# Patient Record
Sex: MALE | Race: WHITE
[De-identification: names, ages, dates, MRNs, and addresses within clinical notes are randomized per-mention and may not be internally consistent; named-entity substitution may affect disease eponyms.]

---

## 2019-10-07 ENCOUNTER — HOSPITAL ENCOUNTER (EMERGENCY)
Dept: HOSPITAL 92 - SCSER | Age: 59
Discharge: HOME | End: 2019-10-07
Payer: COMMERCIAL
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
## 2019-10-07 DIAGNOSIS — I25.2: ICD-10-CM

## 2019-10-07 DIAGNOSIS — Z79.899: ICD-10-CM

## 2019-10-07 DIAGNOSIS — S01.81XA: Primary | ICD-10-CM

## 2019-10-07 DIAGNOSIS — Z23: ICD-10-CM

## 2019-10-07 PROCEDURE — 12011 RPR F/E/E/N/L/M 2.5 CM/<: CPT

## 2019-10-07 PROCEDURE — 90715 TDAP VACCINE 7 YRS/> IM: CPT

## 2019-10-07 PROCEDURE — 90471 IMMUNIZATION ADMIN: CPT

## 2020-02-20 ENCOUNTER — HOSPITAL ENCOUNTER (OUTPATIENT)
Dept: HOSPITAL 92 - CCL | Age: 60
Setting detail: OBSERVATION
LOS: 1 days | Discharge: HOME | End: 2020-02-21
Attending: INTERNAL MEDICINE | Admitting: INTERNAL MEDICINE
Payer: COMMERCIAL

## 2020-02-20 VITALS — BODY MASS INDEX: 29.7 KG/M2

## 2020-02-20 DIAGNOSIS — Z88.0: ICD-10-CM

## 2020-02-20 DIAGNOSIS — I25.2: ICD-10-CM

## 2020-02-20 DIAGNOSIS — Z79.899: ICD-10-CM

## 2020-02-20 DIAGNOSIS — I25.10: Primary | ICD-10-CM

## 2020-02-20 DIAGNOSIS — I11.0: ICD-10-CM

## 2020-02-20 DIAGNOSIS — K21.9: ICD-10-CM

## 2020-02-20 DIAGNOSIS — I50.22: ICD-10-CM

## 2020-02-20 DIAGNOSIS — E78.00: ICD-10-CM

## 2020-02-20 DIAGNOSIS — I48.0: ICD-10-CM

## 2020-02-20 LAB
ANION GAP SERPL CALC-SCNC: 9 MMOL/L (ref 10–20)
APTT PPP: 29.5 SEC (ref 22.9–36.1)
BASOPHILS # BLD AUTO: 0 THOU/UL (ref 0–0.2)
BASOPHILS NFR BLD AUTO: 0.7 % (ref 0–1)
BUN SERPL-MCNC: 22 MG/DL (ref 8.4–25.7)
CALCIUM SERPL-MCNC: 9.4 MG/DL (ref 7.8–10.44)
CHD RISK SERPL-RTO: 2.2 (ref ?–4.5)
CHLORIDE SERPL-SCNC: 107 MMOL/L (ref 98–107)
CHOLEST SERPL-MCNC: 106 MG/DL
CO2 SERPL-SCNC: 30 MMOL/L (ref 22–29)
CREAT CL PREDICTED SERPL C-G-VRATE: 131 ML/MIN (ref 70–130)
EOSINOPHIL # BLD AUTO: 0.1 THOU/UL (ref 0–0.7)
EOSINOPHIL NFR BLD AUTO: 3 % (ref 0–10)
GLUCOSE SERPL-MCNC: 95 MG/DL (ref 70–105)
HDLC SERPL-MCNC: 48 MG/DL
HGB BLD-MCNC: 13.8 G/DL (ref 14–18)
INR PPP: 1.1
LDLC SERPL CALC-MCNC: 51 MG/DL
LYMPHOCYTES # BLD: 0.9 THOU/UL (ref 1.2–3.4)
LYMPHOCYTES NFR BLD AUTO: 25.1 % (ref 21–51)
MCH RBC QN AUTO: 31.2 PG (ref 27–31)
MCV RBC AUTO: 93 FL (ref 78–98)
MONOCYTES # BLD AUTO: 0.4 THOU/UL (ref 0.11–0.59)
MONOCYTES NFR BLD AUTO: 10.4 % (ref 0–10)
NEUTROPHILS # BLD AUTO: 2.2 THOU/UL (ref 1.4–6.5)
NEUTROPHILS NFR BLD AUTO: 60.8 % (ref 42–75)
PLATELET # BLD AUTO: 167 THOU/UL (ref 130–400)
POTASSIUM SERPL-SCNC: 4 MMOL/L (ref 3.5–5.1)
PROTHROMBIN TIME: 13.8 SEC (ref 12–14.7)
RBC # BLD AUTO: 4.42 MILL/UL (ref 4.7–6.1)
SODIUM SERPL-SCNC: 142 MMOL/L (ref 136–145)
TRIGL SERPL-MCNC: 37 MG/DL (ref ?–150)
WBC # BLD AUTO: 3.5 THOU/UL (ref 4.8–10.8)

## 2020-02-20 PROCEDURE — 92928 PRQ TCAT PLMT NTRAC ST 1 LES: CPT

## 2020-02-20 PROCEDURE — 99152 MOD SED SAME PHYS/QHP 5/>YRS: CPT

## 2020-02-20 PROCEDURE — 93010 ELECTROCARDIOGRAM REPORT: CPT

## 2020-02-20 PROCEDURE — C1769 GUIDE WIRE: HCPCS

## 2020-02-20 PROCEDURE — C9600 PERC DRUG-EL COR STENT SING: HCPCS

## 2020-02-20 PROCEDURE — 76942 ECHO GUIDE FOR BIOPSY: CPT

## 2020-02-20 PROCEDURE — 99153 MOD SED SAME PHYS/QHP EA: CPT

## 2020-02-20 PROCEDURE — 85025 COMPLETE CBC W/AUTO DIFF WBC: CPT

## 2020-02-20 PROCEDURE — C1874 STENT, COATED/COV W/DEL SYS: HCPCS

## 2020-02-20 PROCEDURE — 80048 BASIC METABOLIC PNL TOTAL CA: CPT

## 2020-02-20 PROCEDURE — 85610 PROTHROMBIN TIME: CPT

## 2020-02-20 PROCEDURE — 36415 COLL VENOUS BLD VENIPUNCTURE: CPT

## 2020-02-20 PROCEDURE — 93458 L HRT ARTERY/VENTRICLE ANGIO: CPT

## 2020-02-20 PROCEDURE — 80053 COMPREHEN METABOLIC PANEL: CPT

## 2020-02-20 PROCEDURE — 85347 COAGULATION TIME ACTIVATED: CPT

## 2020-02-20 PROCEDURE — 80061 LIPID PANEL: CPT

## 2020-02-20 PROCEDURE — G0378 HOSPITAL OBSERVATION PER HR: HCPCS

## 2020-02-20 PROCEDURE — 85730 THROMBOPLASTIN TIME PARTIAL: CPT

## 2020-02-20 PROCEDURE — 93005 ELECTROCARDIOGRAM TRACING: CPT

## 2020-02-20 PROCEDURE — C1887 CATHETER, GUIDING: HCPCS

## 2020-02-20 RX ADMIN — TICAGRELOR SCH MG: 90 TABLET ORAL at 21:05

## 2020-02-20 RX ADMIN — SACUBITRIL AND VALSARTAN SCH TAB: 49; 51 TABLET, FILM COATED ORAL at 21:05

## 2020-02-20 NOTE — EKG
Test Reason : PREOP

Blood Pressure : ***/*** mmHG

Vent. Rate : 056 BPM     Atrial Rate : 056 BPM

   P-R Int : 194 ms          QRS Dur : 090 ms

    QT Int : 408 ms       P-R-T Axes : 081 030 060 degrees

   QTc Int : 393 ms

 

Sinus bradycardia

Otherwise normal ECG

 

Confirmed by KRISHNA JULES ([id number]) on 2/20/2020 5:00:32 PM

 

Referred By:  PA           Confirmed By:KRISHNA JULES

## 2020-02-21 VITALS — SYSTOLIC BLOOD PRESSURE: 103 MMHG | DIASTOLIC BLOOD PRESSURE: 55 MMHG | TEMPERATURE: 97.3 F

## 2020-02-21 LAB
ALBUMIN SERPL BCG-MCNC: 3.9 G/DL (ref 3.5–5)
ALP SERPL-CCNC: 47 U/L (ref 40–110)
ALT SERPL W P-5'-P-CCNC: 28 U/L (ref 8–55)
ANION GAP SERPL CALC-SCNC: 8 MMOL/L (ref 10–20)
AST SERPL-CCNC: 24 U/L (ref 5–34)
BASOPHILS # BLD AUTO: 0 THOU/UL (ref 0–0.2)
BASOPHILS NFR BLD AUTO: 0.9 % (ref 0–1)
BILIRUB SERPL-MCNC: 0.9 MG/DL (ref 0.2–1.2)
BUN SERPL-MCNC: 18 MG/DL (ref 8.4–25.7)
CALCIUM SERPL-MCNC: 8.8 MG/DL (ref 7.8–10.44)
CHLORIDE SERPL-SCNC: 107 MMOL/L (ref 98–107)
CO2 SERPL-SCNC: 27 MMOL/L (ref 22–29)
CREAT CL PREDICTED SERPL C-G-VRATE: 139 ML/MIN (ref 70–130)
EOSINOPHIL # BLD AUTO: 0.1 THOU/UL (ref 0–0.7)
EOSINOPHIL NFR BLD AUTO: 2.6 % (ref 0–10)
GLOBULIN SER CALC-MCNC: 2.3 G/DL (ref 2.4–3.5)
GLUCOSE SERPL-MCNC: 91 MG/DL (ref 70–105)
HGB BLD-MCNC: 13.1 G/DL (ref 14–18)
LYMPHOCYTES # BLD: 1.2 THOU/UL (ref 1.2–3.4)
LYMPHOCYTES NFR BLD AUTO: 23.2 % (ref 21–51)
MCH RBC QN AUTO: 30.8 PG (ref 27–31)
MCV RBC AUTO: 93.8 FL (ref 78–98)
MONOCYTES # BLD AUTO: 0.5 THOU/UL (ref 0.11–0.59)
MONOCYTES NFR BLD AUTO: 8.8 % (ref 0–10)
NEUTROPHILS # BLD AUTO: 3.4 THOU/UL (ref 1.4–6.5)
NEUTROPHILS NFR BLD AUTO: 64.5 % (ref 42–75)
PLATELET # BLD AUTO: 145 THOU/UL (ref 130–400)
POTASSIUM SERPL-SCNC: 3.8 MMOL/L (ref 3.5–5.1)
RBC # BLD AUTO: 4.25 MILL/UL (ref 4.7–6.1)
SODIUM SERPL-SCNC: 138 MMOL/L (ref 136–145)
WBC # BLD AUTO: 5.2 THOU/UL (ref 4.8–10.8)

## 2020-02-21 PROCEDURE — 4A023N7 MEASUREMENT OF CARDIAC SAMPLING AND PRESSURE, LEFT HEART, PERCUTANEOUS APPROACH: ICD-10-PCS | Performed by: INTERNAL MEDICINE

## 2020-02-21 PROCEDURE — B2111ZZ FLUOROSCOPY OF MULTIPLE CORONARY ARTERIES USING LOW OSMOLAR CONTRAST: ICD-10-PCS | Performed by: INTERNAL MEDICINE

## 2020-02-21 RX ADMIN — TICAGRELOR SCH: 90 TABLET ORAL at 08:57

## 2020-02-21 RX ADMIN — SACUBITRIL AND VALSARTAN SCH: 49; 51 TABLET, FILM COATED ORAL at 08:57

## 2020-02-24 NOTE — EKG
Test Reason : S/P STENTS (2)

Blood Pressure : ***/*** mmHG

Vent. Rate : 059 BPM     Atrial Rate : 059 BPM

   P-R Int : 190 ms          QRS Dur : 094 ms

    QT Int : 410 ms       P-R-T Axes : 058 057 066 degrees

   QTc Int : 405 ms

 

Sinus bradycardia

Septal infarct , age undetermined cannot be excluded

Abnormal ECG

Confirmed by KRISHNA JULES ([id number]) on 2/24/2020 8:52:29 AM

 

Referred By:  PA           Confirmed By:KRISHNA JULES

## 2020-02-24 NOTE — EKG
Test Reason : 

Blood Pressure : ***/*** mmHG

Vent. Rate : 053 BPM     Atrial Rate : 053 BPM

   P-R Int : 190 ms          QRS Dur : 098 ms

    QT Int : 414 ms       P-R-T Axes : 072 042 060 degrees

   QTc Int : 388 ms

 

Sinus bradycardia

Nonspecific ST abnormality

Abnormal ECG

Confirmed by KRISHNA JULES ([id number]) on 2/24/2020 8:59:12 AM

 

Referred By:  PA           Confirmed By:KRISHNA JULES

## 2020-02-24 NOTE — DIS
DATE OF ADMISSION:  02/20/2020



DATE OF DISCHARGE:  02/21/2020



FINAL DIAGNOSES:  

1. Coronary artery disease.

2. Previous myocardial infarction.



PROCEDURE:  Left heart catheterization, intracoronary stent implantation. 



The patient was brought to cardiac catheterization lab yesterday.  He had an LAD

stent in the proximal third of the LAD.  There were some proximal LAD lesions which

were not obstructive.  There was a 70% lesion more distally and then a 95% lesion

distal to that.  The LAD did wrap around the apex.  The patient had been having

ongoing chest pressure.  Therefore, we decided to go and intervene on these other

two lesions.  Two stents were placed.  A 2.25 x 12 mm drug coated stent was placed

in the distal lesion and a 3.0 x 16 mm drug coated stent was placed in the more

proximal lesion.  The patient tolerated these well.  The patient did start shaking

at the end of the procedure, it was unclear how much of that was him being cold,

whether some of it could be related to iodine allergy.  He was given Benadryl and

hydrocortisone and given warm blankets and it all resolved, I think most of it is

probably being cold, but there is some chance that some of this could be iodine

related.  The patient will be going home on the same medicines that he came in on

including aspirin, ticagrelor, Entresto, carvedilol, Lipitor, and Zetia.  He will

see us in couple of weeks. 







Job ID:  [id number]

## 2022-11-07 ENCOUNTER — HOSPITAL ENCOUNTER (OUTPATIENT)
Dept: HOSPITAL 92 - SLEEPLAB | Age: 62
Discharge: HOME | End: 2022-11-07
Attending: NURSE PRACTITIONER
Payer: COMMERCIAL

## 2022-11-07 DIAGNOSIS — R53.83: ICD-10-CM

## 2022-11-07 DIAGNOSIS — G47.00: ICD-10-CM

## 2022-11-07 DIAGNOSIS — I25.10: ICD-10-CM

## 2022-11-07 DIAGNOSIS — I51.89: ICD-10-CM

## 2022-11-07 DIAGNOSIS — G47.33: Primary | ICD-10-CM

## 2022-11-07 DIAGNOSIS — I50.9: ICD-10-CM

## 2022-11-07 DIAGNOSIS — I21.9: ICD-10-CM

## 2022-11-07 DIAGNOSIS — E66.9: ICD-10-CM

## 2022-11-07 PROCEDURE — 95811 POLYSOM 6/>YRS CPAP 4/> PARM: CPT

## 2024-07-09 ENCOUNTER — HOSPITAL ENCOUNTER (OUTPATIENT)
Dept: HOSPITAL 92 - MRI | Age: 64
Discharge: HOME | End: 2024-07-09
Attending: PODIATRIST
Payer: COMMERCIAL

## 2024-07-09 DIAGNOSIS — M21.962: ICD-10-CM

## 2024-07-09 DIAGNOSIS — M84.372D: Primary | ICD-10-CM

## 2024-07-09 DIAGNOSIS — D75.89: ICD-10-CM

## 2024-07-09 DIAGNOSIS — Z95.810: ICD-10-CM

## 2024-07-09 PROCEDURE — 71046 X-RAY EXAM CHEST 2 VIEWS: CPT
